# Patient Record
Sex: MALE | Race: WHITE | Employment: FULL TIME | URBAN - METROPOLITAN AREA
[De-identification: names, ages, dates, MRNs, and addresses within clinical notes are randomized per-mention and may not be internally consistent; named-entity substitution may affect disease eponyms.]

---

## 2022-01-01 ENCOUNTER — HOSPITAL ENCOUNTER (EMERGENCY)
Age: 47
End: 2022-02-26
Attending: EMERGENCY MEDICINE
Payer: COMMERCIAL

## 2022-01-01 VITALS — WEIGHT: 200 LBS | OXYGEN SATURATION: 94 %

## 2022-01-01 DIAGNOSIS — I46.9 CARDIAC ARREST (HCC): Primary | ICD-10-CM

## 2022-01-01 PROCEDURE — 2580000003 HC RX 258

## 2022-01-01 PROCEDURE — 99285 EMERGENCY DEPT VISIT HI MDM: CPT

## 2022-01-01 PROCEDURE — 31500 INSERT EMERGENCY AIRWAY: CPT

## 2022-01-01 PROCEDURE — 92950 HEART/LUNG RESUSCITATION CPR: CPT

## 2022-01-01 PROCEDURE — 2500000003 HC RX 250 WO HCPCS: Performed by: EMERGENCY MEDICINE

## 2022-01-01 RX ORDER — SODIUM CHLORIDE 0.9 % (FLUSH) 0.9 %
SYRINGE (ML) INJECTION DAILY PRN
Status: COMPLETED | OUTPATIENT
Start: 2022-01-01 | End: 2022-01-01

## 2022-01-01 RX ADMIN — SODIUM BICARBONATE 50 MEQ: 84 INJECTION, SOLUTION INTRAVENOUS at 10:43

## 2022-01-01 RX ADMIN — Medication 1000 ML: at 10:45

## 2022-02-26 NOTE — PROGRESS NOTES
Spiritual Care Services     Summary of Visit:  Pt's wife, three children, and other family here after finding and patient unresponsive at home and beginning CPR. Pt had a strong kennedy and a strong community. Prayer provided, grief support provided. Family chose [facility] in [facility] [phone]. Phone call to family Presy[employer]eterian, Cookielen Allen in [facility] also. Rest well good and faithful servant. Spiritual Assessment/Intervention/Outcomes:    Encounter Summary  Services provided to[de-identified] Family  Referral/Consult From[de-identified] Multi-disciplinary team  Support System: Spouse,Children,Scientologist/kennedy community,Friends/neighbors,Family members  Continue Visiting: No  Complexity of Encounter: High  Length of Encounter: 2 hours  Spiritual Assessment Completed: Yes  Routine  Type: Initial  Crisis  Type: Code  Assessment: Grieving  Intervention: Prayer,Sustaining presence/ Ministry of presence,Grief care  Outcome: Grieving                            Care Plan:    Grief support. Spiritual Care Services   Electronically signed by Adina Headley on 2/26/22 at 11:38 AM EST     To reach a  for emotional and spiritual support, place an [facility] consult request.   If a  is needed immediately, dial 0 and ask to page the on-call .

## 2022-02-26 NOTE — ED NOTES
Pt arrived on [facility] from [facility]. Moved patient from their cart to ours via backboard. Bagging in progress via ET tube. Per squad, they gave 9 rounds of Epi and 1 NaBicarb. Per squad, the family told them he went downstairs to work out and they didn't hear back from him so went to check and found him in full arrest. Family started CPR then squad took over on their arrival. Unknown down time but estimated approx 20 min prior to squads arrival. Pt has sores on left forearm and hand. Appears very pale and starting to mottle.  Pt pulseless and apneic on arrival.      Josefa Harris, RN  02/26/22 Nichole Pedroza RN  02/26/22 [id number]

## 2022-02-26 NOTE — ED PROVIDER NOTES
[facility] ED  eMERGENCY dEPARTMENT eNCOUnter      Pt Name: Huma Spring  MRN: [id number]  [place] 1975  Date of evaluation: 2/26/2022  Provider: Claire Parr MD        HISTORY OF PRESENT ILLNESS    Huma Spring is a 51 y.o. male per chart review has no pmh presents to the ED with cardiac arrest.  Per EMS and family, pt was jogging on the treadmill in the basement and then found downstairs unresponsive. Per EMS, pt was found to be in asystole. Pt intubated and CPR started. Total downtime 30 min prior to arrival.  Pt given 9 IV epi per EMS. REVIEW OF SYSTEMS       Review of Systems   Unable to perform ROS: Acuity of condition       Except as noted above the remainder of the review of systems was reviewed and negative. PAST MEDICAL HISTORY   No past medical history on file. SURGICAL HISTORY     No past surgical history on file. CURRENT MEDICATIONS       Previous Medications    No medications on file       ALLERGIES     Patient has no allergy information on record. FAMILY HISTORY     No family history on file.        SOCIAL HISTORY       Social History     Socioeconomic History    Marital status:      Spouse name: Not on file    Number of children: Not on file    Years of education: Not on file    Highest education level: Not on file   Occupational History    Not on file   Tobacco Use    Smoking status: Not on file    Smokeless tobacco: Not on file   Substance and Sexual Activity    Alcohol use: Not on file    Drug use: Not on file    Sexual activity: Not on file   Other Topics Concern    Not on file   Social History Narrative    Not on file     Social Determinants of Health     Financial Resource Strain:     Difficulty of Paying Living Expenses: Not on file   Food Insecurity:     Worried About Running Out of Food in the Last Year: Not on file    Ledy of Food in the Last Year: Not on file   Transportation Needs:     Lack of Transportation (Medical): Not on file    Lack of Transportation (Non-Medical): Not on file   Physical Activity:     Days of Exercise per Week: Not on file    Minutes of Exercise per Session: Not on file   Stress:     Feeling of Stress : Not on file   Social Connections:     Frequency of Communication with Friends and Family: Not on file    Frequency of Social Gatherings with Friends and Family: Not on file    Attends Mandaen Services: Not on file    Active Member of [address] [place] [address] [employer] or Organizations: Not on file    Attends Club or Organization Meetings: Not on file    Marital Status: Not on file   Intimate Partner Violence:     Fear of Current or Ex-Partner: Not on file    Emotionally Abused: Not on file    Physically Abused: Not on file    Sexually Abused: Not on file   Housing Stability:     Unable to Pay for Housing in the Last Year: Not on file    Number of [place] in the Last Year: Not on file    Unstable Housing in the Last Year: Not on file         PHYSICAL EXAM        ED Triage Vitals   BP Temp Temp src Pulse Resp SpO2 Height Weight   -- -- -- -- -- -- -- --       Physical Exam  Vitals and nursing note reviewed. Constitutional:       Appearance: He is well-developed. Comments: Unresponsive 3T   HENT:      Head: Normocephalic. Right Ear: External ear normal.      Left Ear: External ear normal.   Eyes:      Conjunctiva/sclera: Conjunctivae normal.      Pupils: Pupils are equal, round, and reactive to light. Cardiovascular:      Comments: No pulses  Pulmonary:      Comments: No spontaneous respirations  Abdominal:      General: There is no distension. Palpations: Abdomen is soft. Musculoskeletal:         General: Normal range of motion. Cervical back: Neck supple. Skin:     General: Skin is warm and dry. Neurological:      Comments: GCS 3T   Psychiatric:      Comments: Unable to assess           LABS:  Labs Reviewed - No data to display      MDM:   Vitals:   There were no vitals filed for this visit. 50 yo male presents to the ED with cardiac arrest.  Pt arrived in the ED in asystole. ACLS continued per protocol. Pt given IV bicarb. Please see nursing documentation for details. CPR was continued however pt without ROSC. Pt  in the ED. Family informed. CRITICAL CARE TIME   Total CriticalCare time was 30 minutes, excluding separately reportable procedures. There was a high probability of clinically significant/life threatening deterioration in the patient's condition which required my urgent intervention. PROCEDURES:  Unlessotherwise noted below, none      Procedures      FINAL IMPRESSION      1.  Cardiac arrest [facility])          DISPOSITION/PLAN   DISPOSITION  2022 10:53:11 AM          Conner Hussein MD (electronically signed)  Attending Emergency Physician          Conner Hussein MD  22 [id number]

## 2022-02-26 NOTE — ED NOTES
Juan Carlos coronel with this RN and hospital police with hospital supervisor meeting us at the [address] [place] [address]. Papers for [facility] with patient.       Agapito Gregorio RN  02/26/22 [id number]